# Patient Record
Sex: FEMALE | Race: WHITE | Employment: UNEMPLOYED | URBAN - METROPOLITAN AREA
[De-identification: names, ages, dates, MRNs, and addresses within clinical notes are randomized per-mention and may not be internally consistent; named-entity substitution may affect disease eponyms.]

---

## 2023-02-08 RX ORDER — NADOLOL 20 MG/1
20 TABLET ORAL DAILY
COMMUNITY

## 2023-02-08 RX ORDER — FUROSEMIDE 20 MG/1
20 TABLET ORAL DAILY
COMMUNITY

## 2023-02-08 RX ORDER — ROSUVASTATIN CALCIUM 10 MG/1
10 TABLET, COATED ORAL DAILY
COMMUNITY

## 2023-02-08 RX ORDER — SPIRONOLACTONE 50 MG/1
50 TABLET, FILM COATED ORAL DAILY
COMMUNITY

## 2023-02-08 RX ORDER — CALCIUM CARBONATE 500(1250)
500 TABLET ORAL DAILY
COMMUNITY

## 2023-02-08 NOTE — PROGRESS NOTES
[address] [facility] time____1000________        Surgery time__1130__________    Take the following medications with a sip of water: Follow your MD/Surgeons pre-procedure instructions regarding your medications     Do not eat or drink anything after 12:00 midnight prior to your surgery. This includes water chewing gum, mints and ice chips. You may brush your teeth and gargle the morning of your surgery, but do not swallow the water     Please see your family doctor/pediatrician for a history and physical and/or concerning medications. Bring any test results/reports from your physicians office. If you are under the care of a heart doctor or specialist doctor, please be aware that you may be asked to them for clearance    You may be asked to stop blood thinners such as Coumadin, Plavix, Fragmin, Lovenox, etc., or any anti-inflammatories such as:  Aspirin, Ibuprofen, Advil, Naproxen prior to your surgery. We also ask that you stop any OTC medications such as fish oil, vitamin E, glucosamine, garlic, Multivitamins, COQ 10, etc.    We ask that you do not smoke 24 hours prior to surgery  We ask that you do not  drink any alcoholic beverages 24 hours prior to surgery     You must make arrangements for a responsible adult to take you home after your surgery. For your safety you will not be allowed to leave alone or drive yourself home. Your surgery will be cancelled if you do not have a ride home. Also for your safety, it is strongly suggested that someone stay with you the first 24 hours after your surgery. A parent or legal guardian must accompany a child scheduled for surgery and plan to stay at the hospital until the child is discharged. Please do not bring other children with you. For your comfort, please wear simple loose fitting clothing to the hospital.  Please do not bring valuables.     Do not wear any make-up or nail polish on your fingers or toes      For your safety, please do not wear any jewelry or body piercing's on the day of surgery. All jewelry must be removed. If you have dentures, they will be removed before going to operating room. For your convenience, we will provide you with a container. If you wear contact lenses or glasses, they will be removed, please bring a case for them. If you have a living will and a durable power of  for healthcare, please bring in a copy. As part of our patient safety program to minimize surgical site infections, we ask you to do the following:    Please notify your surgeon if you develop any illness between         now and the  day of your surgery. This includes a cough, cold, fever, sore throat, nausea,         or vomiting, and diarrhea, etc.   Please notify your surgeon if you experience dizziness, shortness         of breath or blurred vision between now and the time of your surgery. Do not shave your operative site 96 hours prior to surgery. For face and neck surgery, men may use an electric razor 48 hours   prior to surgery. You may shower the night before surgery or the morning of   your surgery with an antibacterial soap. You will need to bring a photo ID and insurance card    [facility] has an onsite pharmacy, would you like to utilize our pharmacy     If you will be staying overnight and use a C-pap machine, please bring   your C-pap to hospital     Our goal is to provide you with excellent care, therefore, visitors will be limited to two(2) in the room at a time so that we may focus on providing this care for you. Please contact pre-admission testing if you have any further questions. [facility] phone number:  [phone]     [facility] fax number:  [phone]  Please note these are generalized instructions for all surgical cases, you may be provided with more specific instructions according to your surgery.     C-Difficile admission screening and protocol:       * Admitted with diarrhea? [] YES    [x]  NO     *Prior history of C-Diff. In last 3 months? [] YES    [x]  NO     *Antibiotic use in the past 6-8 weeks? [x]  NO    []  YES                 If yes, which ANTIBIOTIC AND REASON______     *Prior hospitalization or nursing home in the last month? []  YES    []  NO        SAFETY FIRST. .call before you fall

## 2023-02-17 ENCOUNTER — ANESTHESIA EVENT (OUTPATIENT)
Dept: SURGERY | Age: 58
End: 2023-02-17
Payer: COMMERCIAL

## 2023-02-17 RX ORDER — SODIUM CHLORIDE 9 MG/ML
INJECTION, SOLUTION INTRAVENOUS PRN
OUTPATIENT
Start: 2023-02-17

## 2023-02-17 RX ORDER — SODIUM CHLORIDE 0.9 % (FLUSH) 0.9 %
5-40 SYRINGE (ML) INJECTION EVERY 12 HOURS SCHEDULED
OUTPATIENT
Start: 2023-02-17

## 2023-02-17 RX ORDER — SODIUM CHLORIDE 0.9 % (FLUSH) 0.9 %
5-40 SYRINGE (ML) INJECTION PRN
OUTPATIENT
Start: 2023-02-17

## 2023-02-17 NOTE — PRE-PROCEDURE INSTRUCTIONS
[address] [facility] [address]  [phone]        Pre-Op Phone Call:     Patient Name: Pebbles Vasquez     Telephone Information:   Mobile [phone]     Home phone:  [phone]    Surgery Time:    8:50 AM     Arrival Time:  0720     Left extended Message:  NA     Message left with:     Recent change in health status:  NA     Advised of transportation/ policy:  Yes     NPO policy reviewed:  Yes     Advised to take morning heart/blood pressure medications with sips of water morning of surgery?  Yes     Instructed to bring eye drops, photo identification, and insurance card day of surgery?  Yes     Advised to wear short sleeved button down shirt (no T-shirt underneath):  Yes     Advised not to wear jewelry, hairpins, or pantyhose day of surgery?  Yes     Advised not to wear make-up and to wash face day of surgery?  Yes    Remarks:        Electronically signed by:  Paulina Oseguera RN at 2/17/2023 9:41 AM

## 2023-02-19 ENCOUNTER — PREP FOR PROCEDURE (OUTPATIENT)
Dept: OPHTHALMOLOGY | Age: 58
End: 2023-02-19

## 2023-02-19 RX ORDER — SODIUM CHLORIDE 0.9 % (FLUSH) 0.9 %
5-40 SYRINGE (ML) INJECTION EVERY 12 HOURS SCHEDULED
Status: CANCELLED | OUTPATIENT
Start: 2023-02-19

## 2023-02-19 RX ORDER — KETOROLAC TROMETHAMINE 5 MG/ML
1 SOLUTION OPHTHALMIC SEE ADMIN INSTRUCTIONS
Status: CANCELLED | OUTPATIENT
Start: 2023-02-19

## 2023-02-19 RX ORDER — CYCLOPENTOLATE HYDROCHLORIDE 10 MG/ML
1 SOLUTION/ DROPS OPHTHALMIC PRN
Status: CANCELLED | OUTPATIENT
Start: 2023-02-19

## 2023-02-19 RX ORDER — PHENYLEPHRINE HCL 2.5 %
1 DROPS OPHTHALMIC (EYE) SEE ADMIN INSTRUCTIONS
Status: CANCELLED | OUTPATIENT
Start: 2023-02-19

## 2023-02-19 RX ORDER — SODIUM CHLORIDE 9 MG/ML
INJECTION, SOLUTION INTRAVENOUS PRN
Status: CANCELLED | OUTPATIENT
Start: 2023-02-19

## 2023-02-19 RX ORDER — TROPICAMIDE 10 MG/ML
1 SOLUTION/ DROPS OPHTHALMIC SEE ADMIN INSTRUCTIONS
Status: CANCELLED | OUTPATIENT
Start: 2023-02-19

## 2023-02-19 RX ORDER — CIPROFLOXACIN HYDROCHLORIDE 3.5 MG/ML
1 SOLUTION/ DROPS TOPICAL SEE ADMIN INSTRUCTIONS
Status: CANCELLED | OUTPATIENT
Start: 2023-02-19

## 2023-02-19 RX ORDER — SODIUM CHLORIDE 0.9 % (FLUSH) 0.9 %
5-40 SYRINGE (ML) INJECTION PRN
Status: CANCELLED | OUTPATIENT
Start: 2023-02-19

## 2023-02-19 RX ORDER — TETRACAINE HYDROCHLORIDE 5 MG/ML
1 SOLUTION OPHTHALMIC SEE ADMIN INSTRUCTIONS
Status: CANCELLED | OUTPATIENT
Start: 2023-02-19

## 2023-02-20 ENCOUNTER — HOSPITAL ENCOUNTER (OUTPATIENT)
Age: 58
Setting detail: OUTPATIENT SURGERY
Discharge: HOME | End: 2023-02-20
Attending: STUDENT IN AN ORGANIZED HEALTH CARE EDUCATION/TRAINING PROGRAM | Admitting: STUDENT IN AN ORGANIZED HEALTH CARE EDUCATION/TRAINING PROGRAM
Payer: COMMERCIAL

## 2023-02-20 ENCOUNTER — ANESTHESIA (OUTPATIENT)
Dept: SURGERY | Age: 58
End: 2023-02-20
Payer: COMMERCIAL

## 2023-02-20 VITALS
BODY MASS INDEX: 27.74 KG/M2 | WEIGHT: 183 LBS | RESPIRATION RATE: 14 BRPM | TEMPERATURE: 96.7 F | OXYGEN SATURATION: 100 % | SYSTOLIC BLOOD PRESSURE: 152 MMHG | DIASTOLIC BLOOD PRESSURE: 97 MMHG | HEIGHT: 68 IN | HEART RATE: 69 BPM

## 2023-02-20 PROCEDURE — 3600000014 HC SURGERY LEVEL 4 ADDTL 15MIN: Performed by: STUDENT IN AN ORGANIZED HEALTH CARE EDUCATION/TRAINING PROGRAM

## 2023-02-20 PROCEDURE — 6370000000 HC RX 637 (ALT 250 FOR IP): Performed by: STUDENT IN AN ORGANIZED HEALTH CARE EDUCATION/TRAINING PROGRAM

## 2023-02-20 PROCEDURE — V2632 POST CHMBR INTRAOCULAR LENS: HCPCS | Performed by: STUDENT IN AN ORGANIZED HEALTH CARE EDUCATION/TRAINING PROGRAM

## 2023-02-20 PROCEDURE — 7100000010 HC PHASE II RECOVERY - FIRST 15 MIN: Performed by: STUDENT IN AN ORGANIZED HEALTH CARE EDUCATION/TRAINING PROGRAM

## 2023-02-20 PROCEDURE — 6360000002 HC RX W HCPCS: Performed by: NURSE ANESTHETIST, CERTIFIED REGISTERED

## 2023-02-20 PROCEDURE — 3700000001 HC ADD 15 MINUTES (ANESTHESIA): Performed by: STUDENT IN AN ORGANIZED HEALTH CARE EDUCATION/TRAINING PROGRAM

## 2023-02-20 PROCEDURE — 3700000000 HC ANESTHESIA ATTENDED CARE: Performed by: STUDENT IN AN ORGANIZED HEALTH CARE EDUCATION/TRAINING PROGRAM

## 2023-02-20 PROCEDURE — 2500000003 HC RX 250 WO HCPCS: Performed by: STUDENT IN AN ORGANIZED HEALTH CARE EDUCATION/TRAINING PROGRAM

## 2023-02-20 PROCEDURE — 3600000004 HC SURGERY LEVEL 4 BASE: Performed by: STUDENT IN AN ORGANIZED HEALTH CARE EDUCATION/TRAINING PROGRAM

## 2023-02-20 PROCEDURE — 2580000003 HC RX 258: Performed by: ANESTHESIOLOGY

## 2023-02-20 PROCEDURE — 2709999900 HC NON-CHARGEABLE SUPPLY: Performed by: STUDENT IN AN ORGANIZED HEALTH CARE EDUCATION/TRAINING PROGRAM

## 2023-02-20 DEVICE — LENS CLAREON IOL 26.0D: Type: IMPLANTABLE DEVICE | Site: EYE | Status: FUNCTIONAL

## 2023-02-20 RX ORDER — SODIUM CHLORIDE 9 MG/ML
INJECTION, SOLUTION INTRAVENOUS PRN
Status: DISCONTINUED | OUTPATIENT
Start: 2023-02-20 | End: 2023-02-20 | Stop reason: SDUPTHER

## 2023-02-20 RX ORDER — PREDNISOLONE ACETATE 10 MG/ML
SUSPENSION/ DROPS OPHTHALMIC
Status: COMPLETED | OUTPATIENT
Start: 2023-02-20 | End: 2023-02-20

## 2023-02-20 RX ORDER — SODIUM CHLORIDE 9 MG/ML
INJECTION, SOLUTION INTRAVENOUS PRN
Status: DISCONTINUED | OUTPATIENT
Start: 2023-02-20 | End: 2023-02-20 | Stop reason: HOSPADM

## 2023-02-20 RX ORDER — TETRACAINE HYDROCHLORIDE 5 MG/ML
SOLUTION OPHTHALMIC
Status: COMPLETED | OUTPATIENT
Start: 2023-02-20 | End: 2023-02-20

## 2023-02-20 RX ORDER — MIDAZOLAM HYDROCHLORIDE 1 MG/ML
INJECTION INTRAMUSCULAR; INTRAVENOUS PRN
Status: DISCONTINUED | OUTPATIENT
Start: 2023-02-20 | End: 2023-02-20 | Stop reason: SDUPTHER

## 2023-02-20 RX ORDER — CIPROFLOXACIN HYDROCHLORIDE 3.5 MG/ML
1 SOLUTION/ DROPS TOPICAL SEE ADMIN INSTRUCTIONS
Status: COMPLETED | OUTPATIENT
Start: 2023-02-20 | End: 2023-02-20

## 2023-02-20 RX ORDER — SODIUM CHLORIDE 0.9 % (FLUSH) 0.9 %
5-40 SYRINGE (ML) INJECTION PRN
Status: DISCONTINUED | OUTPATIENT
Start: 2023-02-20 | End: 2023-02-20 | Stop reason: SDUPTHER

## 2023-02-20 RX ORDER — SODIUM CHLORIDE 0.9 % (FLUSH) 0.9 %
5-40 SYRINGE (ML) INJECTION PRN
Status: DISCONTINUED | OUTPATIENT
Start: 2023-02-20 | End: 2023-02-20 | Stop reason: HOSPADM

## 2023-02-20 RX ORDER — BALANCED SALT SOLUTION 6.4; .75; .48; .3; 3.9; 1.7 MG/ML; MG/ML; MG/ML; MG/ML; MG/ML; MG/ML
SOLUTION OPHTHALMIC
Status: COMPLETED | OUTPATIENT
Start: 2023-02-20 | End: 2023-02-20

## 2023-02-20 RX ORDER — BRIMONIDINE TARTRATE 2 MG/ML
SOLUTION/ DROPS OPHTHALMIC
Status: COMPLETED | OUTPATIENT
Start: 2023-02-20 | End: 2023-02-20

## 2023-02-20 RX ORDER — PHENYLEPHRINE HCL 2.5 %
1 DROPS OPHTHALMIC (EYE) SEE ADMIN INSTRUCTIONS
Status: DISCONTINUED | OUTPATIENT
Start: 2023-02-20 | End: 2023-02-20 | Stop reason: HOSPADM

## 2023-02-20 RX ORDER — TETRACAINE HYDROCHLORIDE 5 MG/ML
1 SOLUTION OPHTHALMIC SEE ADMIN INSTRUCTIONS
Status: DISCONTINUED | OUTPATIENT
Start: 2023-02-20 | End: 2023-02-20 | Stop reason: HOSPADM

## 2023-02-20 RX ORDER — SODIUM CHLORIDE 0.9 % (FLUSH) 0.9 %
5-40 SYRINGE (ML) INJECTION EVERY 12 HOURS SCHEDULED
Status: DISCONTINUED | OUTPATIENT
Start: 2023-02-20 | End: 2023-02-20 | Stop reason: SDUPTHER

## 2023-02-20 RX ORDER — TROPICAMIDE 10 MG/ML
1 SOLUTION/ DROPS OPHTHALMIC SEE ADMIN INSTRUCTIONS
Status: DISCONTINUED | OUTPATIENT
Start: 2023-02-20 | End: 2023-02-20 | Stop reason: HOSPADM

## 2023-02-20 RX ORDER — CYCLOPENTOLATE HYDROCHLORIDE 10 MG/ML
1 SOLUTION/ DROPS OPHTHALMIC PRN
Status: DISCONTINUED | OUTPATIENT
Start: 2023-02-20 | End: 2023-02-20 | Stop reason: HOSPADM

## 2023-02-20 RX ORDER — SODIUM CHLORIDE 0.9 % (FLUSH) 0.9 %
5-40 SYRINGE (ML) INJECTION EVERY 12 HOURS SCHEDULED
Status: DISCONTINUED | OUTPATIENT
Start: 2023-02-20 | End: 2023-02-20 | Stop reason: HOSPADM

## 2023-02-20 RX ORDER — OFLOXACIN 3 MG/ML
SOLUTION/ DROPS OPHTHALMIC
Status: COMPLETED | OUTPATIENT
Start: 2023-02-20 | End: 2023-02-20

## 2023-02-20 RX ORDER — KETOROLAC TROMETHAMINE 5 MG/ML
1 SOLUTION OPHTHALMIC SEE ADMIN INSTRUCTIONS
Status: DISCONTINUED | OUTPATIENT
Start: 2023-02-20 | End: 2023-02-20 | Stop reason: HOSPADM

## 2023-02-20 RX ADMIN — KETOROLAC TROMETHAMINE 1 DROP: 0.5 SOLUTION OPHTHALMIC at 07:54

## 2023-02-20 RX ADMIN — TROPICAMIDE 1 DROP: 10 SOLUTION/ DROPS OPHTHALMIC at 08:04

## 2023-02-20 RX ADMIN — TROPICAMIDE 1 DROP: 10 SOLUTION/ DROPS OPHTHALMIC at 07:54

## 2023-02-20 RX ADMIN — PHENYLEPHRINE HYDROCHLORIDE 1 DROP: 25 SOLUTION/ DROPS OPHTHALMIC at 07:54

## 2023-02-20 RX ADMIN — PHENYLEPHRINE HYDROCHLORIDE 1 DROP: 25 SOLUTION/ DROPS OPHTHALMIC at 08:04

## 2023-02-20 RX ADMIN — SODIUM CHLORIDE: 9 INJECTION, SOLUTION INTRAVENOUS at 08:03

## 2023-02-20 RX ADMIN — TETRACAINE HYDROCHLORIDE 1 DROP: 5 SOLUTION OPHTHALMIC at 07:54

## 2023-02-20 RX ADMIN — PHENYLEPHRINE HYDROCHLORIDE 1 DROP: 25 SOLUTION/ DROPS OPHTHALMIC at 08:09

## 2023-02-20 RX ADMIN — CYCLOPENTOLATE HYDROCHLORIDE 1 DROP: 10 SOLUTION OPHTHALMIC at 07:54

## 2023-02-20 RX ADMIN — TROPICAMIDE 1 DROP: 10 SOLUTION/ DROPS OPHTHALMIC at 08:09

## 2023-02-20 RX ADMIN — CYCLOPENTOLATE HYDROCHLORIDE 1 DROP: 10 SOLUTION OPHTHALMIC at 08:04

## 2023-02-20 RX ADMIN — CIPROFLOXACIN 1 DROP: 3 SOLUTION OPHTHALMIC at 07:54

## 2023-02-20 RX ADMIN — MIDAZOLAM 2 MG: 1 INJECTION INTRAMUSCULAR; INTRAVENOUS at 09:11

## 2023-02-20 RX ADMIN — CYCLOPENTOLATE HYDROCHLORIDE 1 DROP: 10 SOLUTION OPHTHALMIC at 08:09

## 2023-02-20 ASSESSMENT — LIFESTYLE VARIABLES: SMOKING_STATUS: 0

## 2023-02-20 ASSESSMENT — PAIN - FUNCTIONAL ASSESSMENT: PAIN_FUNCTIONAL_ASSESSMENT: NONE - DENIES PAIN

## 2023-02-20 ASSESSMENT — PAIN SCALES - GENERAL
PAINLEVEL_OUTOF10: 0

## 2023-02-20 NOTE — OP NOTE
Anne Abreu    OPERATIVE NOTE    Preoperative Diagnosis: Visually significant cataract left eye    Postoperative Diagnosis: Visually significant cataract left eye    Procedure: Phacoemulsification with intraocular lens implantation, left eye    Surgeon: Susan Modi. Rylie Acosta MD, CRYS    Anesthesia: MAC, topical.    Complications: none    Estimated blood loss: less than 1 ml. Specimens: none    Indications for procedure: The patient is a 61y.o. year old who has experienced painless, progressive deterioration in vision which corresponds with increasing lenticular opacification of the left eye. This is contributing to an overall decline in visual functioning and interfering with activities of daily living as described in the medical record. After discussion of relevant indications, risks, benefits, alternatives, and limitations, the patient consented to proceed with cataract phacoemulsification with lens implantation for visual rehabilitation. Details of the procedure: Following informed consent, the patient was taken to the operating room and placed in the supine position. Monitored anesthesia care was administered. The patient's name, eye, intraocular lens model and power were verified (time-out). The patient was positioned under the operative microscope and the operative eye was prepped and draped in the usual sterile fashion using aseptic technique for cataract surgery. A wire lid speculum was placed. A peripheral paracentesis was made using a 1.1 mm blade. 2% preservative free lidocaine with dilute 1:1000 preservative free epinephrine (Epi-Shugarcaine) was injected through the side port incision for topical anesthesia. The anterior chamber was deepened with viscoelastic. A tri-planar temporal clear cornea incision was made with a 2.4 mm keratome. A continuous curvilinear capsulorrhexis was initiated with a cystotome and completed using Utrata forceps.  Gentle hydrodissection and hydrodelineation were performed with confirmed free rotation of the nucleus. The lens nucleus was carefully removed using the phacoemulsification probe (CDE: 5.74). Any remaining cortex was removed using the automated irrigation/aspiration unit. The posterior capsule was vacuum polished clean. The capsular bag was deepened using viscoelastic. The intraocular lens was loaded into the injection cartridge. The lens power and type were confirmed (SY60WF 26.0D). The IOL was delivered into the capsular bag. The trailing haptic was dialed into position with a Sinskey hook. Residual viscoelastic was removed with the irrigation/aspiration unit. The eye was inflated with sterile BSS to a physiologic IOP. The corneal wounds were hydrated with sterile BSS, inspected and found to be water tight. The intraocular lens was confirmed to be well centered and the pupil round at the conclusion of the case. The lid speculum and drape were removed. The patient had Ofloxacin. Prednisolone, and Alphagan solutions placed on the eye. A sterile eye shield was placed over the eye. The patient tolerated the cataract surgery well without complication. The patient was taken to PACU in excellent condition. The patient will remove the shield at a designated time later in the day and begin using post-operative eyedrops as instructed.  The patient is scheduled to return on the first post-operative day for follow up.      9:30 AM 02/20/23  Ev Dumont MD, CRYS

## 2023-02-20 NOTE — ANESTHESIA POSTPROCEDURE EVALUATION
Department of Anesthesiology  Postprocedure Note    Patient: Latoya Osie  MRN: [id number]  YOB: 1965  Date of evaluation: 2/20/2023      Procedure Summary     Date: 02/20/23 Room / Location: [address]    Anesthesia Start: 9335 Anesthesia Stop: 0583    Procedure: PHACOEMULSIFICATION WITH INTRAOCULAR LENS IMPLANT - LEFT EYE (Left: Eye) Diagnosis:       Nuclear sclerotic cataract of left eye      (Nuclear sclerotic cataract of left eye)    Surgeons: Lulú Phelan MD Responsible Provider: Price Krishna MD    Anesthesia Type: MAC ASA Status: 3          Anesthesia Typ: MAC    Ivy Phase I: Ivy Score: 10    Ivy Phase II: Ivy Score: 10      Anesthesia Post Evaluation    Patient location during evaluation: PACU  Patient participation: complete - patient participated  Level of consciousness: awake  Airway patency: patent  Nausea & Vomiting: no nausea and no vomiting  Complications: no  Cardiovascular status: hemodynamically stable and blood pressure returned to baseline  Respiratory status: spontaneous ventilation, nonlabored ventilation and room air  Hydration status: stable  Comments: Ms. Miranda Wright was seen resting comfortably following procedure. Appropriate for discharge home with .

## 2023-02-20 NOTE — ANESTHESIA PRE PROCEDURE
Department of Anesthesiology  Preprocedure Note       Name:  Abe Milian   Age:  61 y.o.  :  1965                                          MRN:  [id number]         Date:  2023      Surgeon: Chad Nobles):  Jessica Lawrence MD    Procedure: Procedure(s):  PHACOEMULSIFICATION WITH INTRAOCULAR LENS IMPLANT - LEFT EYE    Medications prior to admission:   Prior to Admission medications    Medication Sig Start Date End Date Taking?  Authorizing Provider   lactulose (CEPHULAC) 20 g packet Take 20 g by mouth 3 times daily   Yes Historical Provider, MD   furosemide (LASIX) 20 MG tablet Take 20 mg by mouth daily   Yes Historical Provider, MD   linaCLOtide (LINZESS) 72 MCG CAPS capsule Take 72 mcg by mouth every morning (before breakfast)   Yes Historical Provider, MD   spironolactone (ALDACTONE) 50 MG tablet Take 50 mg by mouth daily   Yes Historical Provider, MD   rifAXIMin (XIFAXAN) 550 MG tablet Take 550 mg by mouth 2 times daily   Yes Historical Provider, MD   nadolol (CORGARD) 20 MG tablet Take 20 mg by mouth daily   Yes Historical Provider, MD   rosuvastatin (CRESTOR) 10 MG tablet Take 10 mg by mouth daily   Yes Historical Provider, MD   calcium carbonate (OSCAL) 500 MG TABS tablet Take 500 mg by mouth daily   Yes Historical Provider, MD       Current medications:    Current Facility-Administered Medications   Medication Dose Route Frequency Provider Last Rate Last Admin    sodium chloride flush 0.9 % injection 5-40 mL  5-40 mL IntraVENous 2 times per day Colletta Smiles, MD        sodium chloride flush 0.9 % injection 5-40 mL  5-40 mL IntraVENous PRN Colletta Smiles, MD        0.9 % sodium chloride infusion   IntraVENous PRN Colletta Smiles, MD        tetracaine (TETRAVISC) 0.5 % ophthalmic solution 1 drop  1 drop Left Eye See Admin Instructions Jessica Lawrence MD   1 drop at 23 0754    tropicamide (MYDRIACYL) 1 % ophthalmic solution 1 drop  1 drop Left Eye See Admin Instructions Jessica Lawrence MD   1 drop at 23 0809    phenylephrine (MYDFRIN) 2.5 % ophthalmic solution 1 drop  1 drop Left Eye See Admin Instructions Emily Aviles MD   1 drop at 23 0809    cyclopentolate (CYCLOGYL) 1 % ophthalmic solution 1 drop  1 drop Left Eye PRN Emily Aviles MD   1 drop at 23 0809    ketorolac (ACULAR) 0.5 % ophthalmic solution 1 drop  1 drop Left Eye See Admin Instructions Emily Aviles MD   1 drop at 23 0754       Allergies: Allergies   Allergen Reactions    Latex        Problem List:  There is no problem list on file for this patient. Past Medical History:        Diagnosis Date    Hyperlipidemia     Hypertension     Liver disease        Past Surgical History:        Procedure Laterality Date    APPENDECTOMY       SECTION      CHOLECYSTECTOMY         Social History:    Social History     Tobacco Use    Smoking status: Never    Smokeless tobacco: Never   Substance Use Topics    Alcohol use: Not Currently     Comment: occasionally                                Counseling given: Not Answered      Vital Signs (Current):   Vitals:    23 1036 23 0752   BP:  131/75   Pulse:  71   Resp:  16   Temp:  97 °F (36.1 °C)   TempSrc:  Temporal   SpO2:  98%   Weight: 183 lb (83 kg) 183 lb (83 kg)   Height: 5' 8\" (1.727 m) 5' 8\" (1.727 m)                                              BP Readings from Last 3 Encounters:   23 131/75   11/06/15 128/79       NPO Status: Time of last liquid consumption: 2359                        Time of last solid consumption: 1800                        Date of last liquid consumption: 23                        Date of last solid food consumption: 23    BMI:   Wt Readings from Last 3 Encounters:   23 183 lb (83 kg)   11/06/15 190 lb 3.2 oz (86.3 kg)     Body mass index is 27.83 kg/m².     CBC:   Lab Results   Component Value Date/Time    WBC 7.0 2015 12:40 PM    RBC 4.25 2015 12:40 PM    HGB 13.5 2015 12:40 PM    HCT 41.1 2015 12:40 PM    MCV 96.7 11/03/2015 12:40 PM    RDW 13.3 11/03/2015 12:40 PM     11/03/2015 12:40 PM       CMP:   Lab Results   Component Value Date/Time     11/03/2015 12:40 PM    K 3.9 11/03/2015 12:40 PM     11/03/2015 12:40 PM    CO2 24 11/03/2015 12:40 PM    BUN 10 11/03/2015 12:40 PM    CREATININE 0.5 11/03/2015 12:40 PM    GFRAA >60 11/03/2015 12:40 PM    GFRAA >60 11/14/2012 08:55 PM    AGRATIO 1.2 11/14/2012 08:55 PM    LABGLOM >60 11/03/2015 12:40 PM    GLUCOSE 105 11/03/2015 12:40 PM    PROT 8.3 11/14/2012 08:55 PM    CALCIUM 9.0 11/03/2015 12:40 PM    BILITOT 1.00 11/14/2012 08:55 PM    ALKPHOS 196 11/14/2012 08:55 PM     11/14/2012 08:55 PM    ALT 58 11/14/2012 08:55 PM       POC Tests: No results for input(s): POCGLU, POCNA, POCK, POCCL, POCBUN, POCHEMO, POCHCT in the last 72 hours. Coags: No results found for: PROTIME, INR, APTT    HCG (If Applicable):   Lab Results   Component Value Date    PREGTESTUR Neg 11/14/2012        ABGs: No results found for: PHART, PO2ART, BTE2CFD, TVN1KJX, BEART, G6SPVMMB     Type & Screen (If Applicable):  No results found for: LABABO, LABRH    Drug/Infectious Status (If Applicable):  No results found for: HIV, HEPCAB    COVID-19 Screening (If Applicable): No results found for: COVID19        Anesthesia Evaluation   no history of anesthetic complications:   Airway: Mallampati: II  TM distance: >3 FB     Mouth opening: > = 3 FB   Dental:      Comment: Very poor dentition. Upper incisors blackened, chipped near gumline.     Pulmonary:       (-) COPD, asthma, rhonchi, wheezes, rales and not a current smoker                           Cardiovascular:  Exercise tolerance: good (>4 METS),   (+) hypertension:, hyperlipidemia    (-) dysrhythmias, orthopnea,  MIJARES, weak pulses and peripheral edema      Rhythm: regular  Rate: normal           Beta Blocker:  Dose within 24 Hrs (nadolol )         Neuro/Psych:   (+) psychiatric history (History of EtOH abuse):dementia (memory issues)   (-) seizures, TIA and CVA           GI/Hepatic/Renal:   (+) liver disease (Alcoholic cirrhosis, last LFTs elevated):,      (-) GERD (denies), no renal disease and no morbid obesity      ROS comment: +Linzess. Endo/Other:        (-) diabetes mellitus, blood dyscrasia                ROS comment: History of alcohol use disorder. Previously reported alcohol cessationsince 2018, patient reports sober x 7 months. Abdominal:         (-) obese Abdomen: soft. PE comment: No fluid wave, no concern for ascites. Vascular: Other Findings:           Anesthesia Plan      MAC     ASA 3     (Latex allergy. NPO appropriate. Ms. Estrada Pruitt denies active nausea / reflux.)        Anesthetic plan and risks discussed with patient. Plan discussed with CRNA. This pre-anesthesia assessment may be used as a history and physical.    DOS STAFF ADDENDUM:    Pt seen and examined, chart reviewed (including anesthesia, drug and allergy history). No interval changes to history and physical examination. Anesthetic plan, risks, benefits, alternatives, and personnel involved discussed with patient. Patient verbalized an understanding and agrees to proceed.       Yosef Longo MD  February 20, 2023  8:00 AM

## 2023-02-20 NOTE — DISCHARGE INSTRUCTIONS
Sol Rodriguez MD, CRYS    POST OPERATIVE INSTRUCTIONS FOR CATARACT SURGERY    Left   Eye       Starting the DAY of surgery use all drops as directed. Please locate the name of the individual medication you were prescribed below and follow the instructions for its use. ANTIBIOTIC: (CORREIA Cap): Your drop will be either Vigamox (moxifloxacin), Besivance or Polytrim. Instill 4 drops a day for 1 week. NSAID (GRAY Cap): Your drop will be BROMSITE, PROLENSA or KETOROLAC. Instill 1 drop daily for 4 weeks. Patients who receive KETOROLAC: Instill 1 drop 2 times a day for 2 weeks. STEROID (PINK Cap): Your steroid drop will be PREDNISOLONE ACETATE, INVELTYS OR LOTEMAX SM. Instill 4 drops a day for 1 week,   3 drops a day for 1 week,   2 drops a day for 1 week,  1 drop a day for 1 week. Please bring ALL of your eye drops with you to surgery and all follow-up appointments. Wait at least 3-5 minutes between eye drops. It's normal for some drops to briefly sting. POST OPERATIVE CATARACT DROP SCHEDULE    Week 1 Day 1 Day 2 Day 3 Day 4 Day 5 Day 6 Day 7   Polo Pratt or POLYTRIM  (Tan) ? ?  ? ? ? ?  ? ? ? ?  ? ? ? ?  ? ? ? ?  ? ? ? ?  ? ?   ? ?  ? ?     Beuford Pleasure or KETOROLAC  Olimpia Mends) ? ? ? ? ? ? ? Green [place] or LOTEMAX SM  (Pink) ? ?  ? ? ? ?  ? ? ? ?  ? ? ? ?  ? ? ? ?  ? ? ? ?  ? ? ? ?  ? ? Week 2 Day 8 Day 9 Day 10 Day 11 Day 12 Day 13 Day 15   BROMSITE, PROLENSA or  KETOROLAC  Olimpia Mends) ? ? ? ? ? ? ? Green [place] or LOTEMAX SM   (Pink) ? ? ? ? ? ? ? ? ? ? ? ? ? ? ? ? ? ? ? ? ? Week 3 Day 15 Day 16 Day 17 Day 18 Day 19 Day 20 Day 21   Beuford Pleasure or  Olimpia Mends) ? ? ? ? ? ? ? Green [place] or LOTEMAX SM   (Pink) ? ? ? ? ? ? ? ? ? ? ? ? ? ? Week 4 Day 22 Day 23 Day 24 Day 25 Day 26 Day 2 Day 29   Beuford Pleasure or  Olimpia Gillis) ? ? ? ? ? ? ? Green [place] or LOTEMAX SM   (Pink) ?  ? ? ? ? ? ?       Wear your protective shield at bedtime and nap time for 1 week to prevent accidentally rubbing or bumping your eye.    The post-operative drops are VERY IMPORTANT. Use them as directed on the drop schedule given to you the day of surgery.    Do not lift over 25 lbs for the first week.    DO NOT RUB YOUR EYE.    You may wash your hair 24 hours after surgery, but avoid getting water in your eye for the first 5 days. Use a dry wash cloth to protect water from getting in your eye while taking a shower.    No eye makeup for 1 week.    You may return to work anytime provided your job does not require heavy lifting or straining. If you work in a jerrod environment, please take one week off work after surgery.    CALL THE OFFICE IMMEDIATELY IF YOU EXPERIENCE ANY OF THE FOLLOWING                   [phone]  Veil or curtain coming across vision.  Sudden decrease in vision.  Shower of NEW floaters.  Increase in pain.  Flashes of light.

## 2023-02-24 NOTE — PROGRESS NOTES
[address] [facility] time___1010_________        Surgery time__1140__________    Take the following medications with a sip of water: Follow your MD/Surgeons pre-procedure instructions regarding your medications     Do not eat or drink anything after 12:00 midnight prior to your surgery. This includes water chewing gum, mints and ice chips. You may brush your teeth and gargle the morning of your surgery, but do not swallow the water     Please see your family doctor/pediatrician for a history and physical and/or concerning medications. Bring any test results/reports from your physicians office. If you are under the care of a heart doctor or specialist doctor, please be aware that you may be asked to them for clearance    You may be asked to stop blood thinners such as Coumadin, Plavix, Fragmin, Lovenox, etc., or any anti-inflammatories such as:  Aspirin, Ibuprofen, Advil, Naproxen prior to your surgery. We also ask that you stop any OTC medications such as fish oil, vitamin E, glucosamine, garlic, Multivitamins, COQ 10, etc.    We ask that you do not smoke 24 hours prior to surgery  We ask that you do not  drink any alcoholic beverages 24 hours prior to surgery     You must make arrangements for a responsible adult to take you home after your surgery. For your safety you will not be allowed to leave alone or drive yourself home. Your surgery will be cancelled if you do not have a ride home. Also for your safety, it is strongly suggested that someone stay with you the first 24 hours after your surgery. A parent or legal guardian must accompany a child scheduled for surgery and plan to stay at the hospital until the child is discharged. Please do not bring other children with you. For your comfort, please wear simple loose fitting clothing to the hospital.  Please do not bring valuables.     Do not wear any make-up or nail polish on your fingers or toes      For your safety, please do not wear any jewelry or body piercing's on the day of surgery. All jewelry must be removed. If you have dentures, they will be removed before going to operating room. For your convenience, we will provide you with a container. If you wear contact lenses or glasses, they will be removed, please bring a case for them. If you have a living will and a durable power of  for healthcare, please bring in a copy. As part of our patient safety program to minimize surgical site infections, we ask you to do the following:    Please notify your surgeon if you develop any illness between         now and the  day of your surgery. This includes a cough, cold, fever, sore throat, nausea,         or vomiting, and diarrhea, etc.   Please notify your surgeon if you experience dizziness, shortness         of breath or blurred vision between now and the time of your surgery. Do not shave your operative site 96 hours prior to surgery. For face and neck surgery, men may use an electric razor 48 hours   prior to surgery. You may shower the night before surgery or the morning of   your surgery with an antibacterial soap. You will need to bring a photo ID and insurance card    [facility] has an onsite pharmacy, would you like to utilize our pharmacy     If you will be staying overnight and use a C-pap machine, please bring   your C-pap to hospital     Our goal is to provide you with excellent care, therefore, visitors will be limited to two(2) in the room at a time so that we may focus on providing this care for you. Please contact pre-admission testing if you have any further questions. [facility] phone number:  [phone]     [facility] fax number:  [phone]  Please note these are generalized instructions for all surgical cases, you may be provided with more specific instructions according to your surgery.     C-Difficile admission screening and protocol:       * Admitted with diarrhea? [] YES    [x]  NO     *Prior history of C-Diff. In last 3 months? [] YES    [x]  NO     *Antibiotic use in the past 6-8 weeks? [x]  NO    []  YES                 If yes, which ANTIBIOTIC AND REASON______     *Prior hospitalization or nursing home in the last month? []  YES    []  NO        SAFETY FIRST. .call before you fall

## 2023-03-03 NOTE — PERIOP NOTE
[facility]  [phone]        Pre-Op Phone Call:     Patient Name: Rufino Salazar     Telephone Information:   Mobile [phone]     Home phone:  [phone]    Surgery Time:    7:55 AM     Arrival Time:  6:25     Left extended Message:  Yes     Message left with: Spoke with patient      Recent change in health status:  No     Advised of transportation/ policy:  Yes     NPO policy reviewed: Yes     Advised to take morning heart/blood pressure medications with sips of water morning of surgery? Yes     Instructed to bring eye drops, photo identification, and insurance card day of surgery? Yes     Advised to wear short sleeved button down shirt (no T-shirt underneath):  Yes     Advised not to wear jewelry, hairpins, or pantyhose day of surgery? Yes     Advised not to wear make-up and to wash face day of surgery?   Yes    Remarks: Spoke with patient         Electronically signed by:  Brenna Sprague RN at 3/3/2023 9:54 AM

## 2023-03-05 ENCOUNTER — ANESTHESIA EVENT (OUTPATIENT)
Dept: SURGERY | Age: 58
End: 2023-03-05
Payer: COMMERCIAL

## 2023-03-05 RX ORDER — PHENYLEPHRINE HCL 2.5 %
1 DROPS OPHTHALMIC (EYE) SEE ADMIN INSTRUCTIONS
Status: CANCELLED | OUTPATIENT
Start: 2023-03-05

## 2023-03-05 RX ORDER — KETOROLAC TROMETHAMINE 5 MG/ML
1 SOLUTION OPHTHALMIC SEE ADMIN INSTRUCTIONS
Status: CANCELLED | OUTPATIENT
Start: 2023-03-05

## 2023-03-05 RX ORDER — TETRACAINE HYDROCHLORIDE 5 MG/ML
1 SOLUTION OPHTHALMIC SEE ADMIN INSTRUCTIONS
Status: CANCELLED | OUTPATIENT
Start: 2023-03-05

## 2023-03-05 RX ORDER — TROPICAMIDE 10 MG/ML
1 SOLUTION/ DROPS OPHTHALMIC SEE ADMIN INSTRUCTIONS
Status: CANCELLED | OUTPATIENT
Start: 2023-03-05

## 2023-03-05 RX ORDER — SODIUM CHLORIDE 0.9 % (FLUSH) 0.9 %
5-40 SYRINGE (ML) INJECTION PRN
Status: CANCELLED | OUTPATIENT
Start: 2023-03-05

## 2023-03-05 RX ORDER — CYCLOPENTOLATE HYDROCHLORIDE 10 MG/ML
1 SOLUTION/ DROPS OPHTHALMIC PRN
Status: CANCELLED | OUTPATIENT
Start: 2023-03-05

## 2023-03-05 RX ORDER — SODIUM CHLORIDE 0.9 % (FLUSH) 0.9 %
5-40 SYRINGE (ML) INJECTION EVERY 12 HOURS SCHEDULED
Status: CANCELLED | OUTPATIENT
Start: 2023-03-05

## 2023-03-05 RX ORDER — SODIUM CHLORIDE 9 MG/ML
INJECTION, SOLUTION INTRAVENOUS PRN
Status: CANCELLED | OUTPATIENT
Start: 2023-03-05

## 2023-03-05 RX ORDER — CIPROFLOXACIN HYDROCHLORIDE 3.5 MG/ML
1 SOLUTION/ DROPS TOPICAL SEE ADMIN INSTRUCTIONS
Status: CANCELLED | OUTPATIENT
Start: 2023-03-05

## 2023-03-05 ASSESSMENT — LIFESTYLE VARIABLES: SMOKING_STATUS: 0

## 2023-03-06 ENCOUNTER — HOSPITAL ENCOUNTER (OUTPATIENT)
Age: 58
Setting detail: OUTPATIENT SURGERY
Discharge: HOME | End: 2023-03-06
Attending: STUDENT IN AN ORGANIZED HEALTH CARE EDUCATION/TRAINING PROGRAM | Admitting: STUDENT IN AN ORGANIZED HEALTH CARE EDUCATION/TRAINING PROGRAM
Payer: COMMERCIAL

## 2023-03-06 ENCOUNTER — ANESTHESIA (OUTPATIENT)
Dept: SURGERY | Age: 58
End: 2023-03-06
Payer: COMMERCIAL

## 2023-03-06 VITALS
DIASTOLIC BLOOD PRESSURE: 64 MMHG | SYSTOLIC BLOOD PRESSURE: 106 MMHG | BODY MASS INDEX: 27.74 KG/M2 | RESPIRATION RATE: 15 BRPM | HEIGHT: 68 IN | OXYGEN SATURATION: 100 % | WEIGHT: 183 LBS | HEART RATE: 65 BPM | TEMPERATURE: 97.3 F

## 2023-03-06 PROCEDURE — 3600000014 HC SURGERY LEVEL 4 ADDTL 15MIN: Performed by: STUDENT IN AN ORGANIZED HEALTH CARE EDUCATION/TRAINING PROGRAM

## 2023-03-06 PROCEDURE — 2500000003 HC RX 250 WO HCPCS: Performed by: STUDENT IN AN ORGANIZED HEALTH CARE EDUCATION/TRAINING PROGRAM

## 2023-03-06 PROCEDURE — V2632 POST CHMBR INTRAOCULAR LENS: HCPCS | Performed by: STUDENT IN AN ORGANIZED HEALTH CARE EDUCATION/TRAINING PROGRAM

## 2023-03-06 PROCEDURE — 2580000003 HC RX 258: Performed by: NURSE ANESTHETIST, CERTIFIED REGISTERED

## 2023-03-06 PROCEDURE — 2580000003 HC RX 258: Performed by: ANESTHESIOLOGY

## 2023-03-06 PROCEDURE — 6370000000 HC RX 637 (ALT 250 FOR IP): Performed by: STUDENT IN AN ORGANIZED HEALTH CARE EDUCATION/TRAINING PROGRAM

## 2023-03-06 PROCEDURE — 7100000010 HC PHASE II RECOVERY - FIRST 15 MIN: Performed by: STUDENT IN AN ORGANIZED HEALTH CARE EDUCATION/TRAINING PROGRAM

## 2023-03-06 PROCEDURE — 3700000000 HC ANESTHESIA ATTENDED CARE: Performed by: STUDENT IN AN ORGANIZED HEALTH CARE EDUCATION/TRAINING PROGRAM

## 2023-03-06 PROCEDURE — 6360000002 HC RX W HCPCS: Performed by: NURSE ANESTHETIST, CERTIFIED REGISTERED

## 2023-03-06 PROCEDURE — 3700000001 HC ADD 15 MINUTES (ANESTHESIA): Performed by: STUDENT IN AN ORGANIZED HEALTH CARE EDUCATION/TRAINING PROGRAM

## 2023-03-06 PROCEDURE — 3600000004 HC SURGERY LEVEL 4 BASE: Performed by: STUDENT IN AN ORGANIZED HEALTH CARE EDUCATION/TRAINING PROGRAM

## 2023-03-06 PROCEDURE — 2709999900 HC NON-CHARGEABLE SUPPLY: Performed by: STUDENT IN AN ORGANIZED HEALTH CARE EDUCATION/TRAINING PROGRAM

## 2023-03-06 DEVICE — IMPLANTABLE DEVICE: Type: IMPLANTABLE DEVICE | Site: EYE | Status: FUNCTIONAL

## 2023-03-06 RX ORDER — TETRACAINE HYDROCHLORIDE 5 MG/ML
SOLUTION OPHTHALMIC
Status: COMPLETED | OUTPATIENT
Start: 2023-03-06 | End: 2023-03-06

## 2023-03-06 RX ORDER — BALANCED SALT SOLUTION 6.4; .75; .48; .3; 3.9; 1.7 MG/ML; MG/ML; MG/ML; MG/ML; MG/ML; MG/ML
SOLUTION OPHTHALMIC
Status: COMPLETED | OUTPATIENT
Start: 2023-03-06 | End: 2023-03-06

## 2023-03-06 RX ORDER — SODIUM CHLORIDE 0.9 % (FLUSH) 0.9 %
5-40 SYRINGE (ML) INJECTION EVERY 12 HOURS SCHEDULED
Status: DISCONTINUED | OUTPATIENT
Start: 2023-03-06 | End: 2023-03-06 | Stop reason: HOSPADM

## 2023-03-06 RX ORDER — PREDNISOLONE ACETATE 10 MG/ML
SUSPENSION/ DROPS OPHTHALMIC
Status: COMPLETED | OUTPATIENT
Start: 2023-03-06 | End: 2023-03-06

## 2023-03-06 RX ORDER — CYCLOPENTOLATE HYDROCHLORIDE 10 MG/ML
1 SOLUTION/ DROPS OPHTHALMIC PRN
Status: DISCONTINUED | OUTPATIENT
Start: 2023-03-06 | End: 2023-03-06 | Stop reason: HOSPADM

## 2023-03-06 RX ORDER — MIDAZOLAM HYDROCHLORIDE 1 MG/ML
INJECTION INTRAMUSCULAR; INTRAVENOUS PRN
Status: DISCONTINUED | OUTPATIENT
Start: 2023-03-06 | End: 2023-03-06 | Stop reason: SDUPTHER

## 2023-03-06 RX ORDER — TROPICAMIDE 10 MG/ML
1 SOLUTION/ DROPS OPHTHALMIC SEE ADMIN INSTRUCTIONS
Status: DISCONTINUED | OUTPATIENT
Start: 2023-03-06 | End: 2023-03-06 | Stop reason: HOSPADM

## 2023-03-06 RX ORDER — SODIUM CHLORIDE 9 MG/ML
INJECTION, SOLUTION INTRAVENOUS PRN
Status: DISCONTINUED | OUTPATIENT
Start: 2023-03-06 | End: 2023-03-06 | Stop reason: HOSPADM

## 2023-03-06 RX ORDER — SODIUM CHLORIDE 0.9 % (FLUSH) 0.9 %
5-40 SYRINGE (ML) INJECTION EVERY 12 HOURS SCHEDULED
Status: DISCONTINUED | OUTPATIENT
Start: 2023-03-06 | End: 2023-03-06 | Stop reason: SDUPTHER

## 2023-03-06 RX ORDER — TETRACAINE HYDROCHLORIDE 5 MG/ML
1 SOLUTION OPHTHALMIC SEE ADMIN INSTRUCTIONS
Status: DISCONTINUED | OUTPATIENT
Start: 2023-03-06 | End: 2023-03-06 | Stop reason: HOSPADM

## 2023-03-06 RX ORDER — KETOROLAC TROMETHAMINE 5 MG/ML
1 SOLUTION OPHTHALMIC SEE ADMIN INSTRUCTIONS
Status: DISCONTINUED | OUTPATIENT
Start: 2023-03-06 | End: 2023-03-06 | Stop reason: HOSPADM

## 2023-03-06 RX ORDER — PHENYLEPHRINE HCL 2.5 %
1 DROPS OPHTHALMIC (EYE) SEE ADMIN INSTRUCTIONS
Status: DISCONTINUED | OUTPATIENT
Start: 2023-03-06 | End: 2023-03-06 | Stop reason: HOSPADM

## 2023-03-06 RX ORDER — SODIUM CHLORIDE 9 MG/ML
INJECTION, SOLUTION INTRAVENOUS CONTINUOUS PRN
Status: DISCONTINUED | OUTPATIENT
Start: 2023-03-06 | End: 2023-03-06 | Stop reason: SDUPTHER

## 2023-03-06 RX ORDER — BRIMONIDINE TARTRATE 2 MG/ML
SOLUTION/ DROPS OPHTHALMIC
Status: COMPLETED | OUTPATIENT
Start: 2023-03-06 | End: 2023-03-06

## 2023-03-06 RX ORDER — OFLOXACIN 3 MG/ML
SOLUTION/ DROPS OPHTHALMIC
Status: COMPLETED | OUTPATIENT
Start: 2023-03-06 | End: 2023-03-06

## 2023-03-06 RX ORDER — SODIUM CHLORIDE 0.9 % (FLUSH) 0.9 %
5-40 SYRINGE (ML) INJECTION PRN
Status: DISCONTINUED | OUTPATIENT
Start: 2023-03-06 | End: 2023-03-06 | Stop reason: HOSPADM

## 2023-03-06 RX ORDER — SODIUM CHLORIDE 0.9 % (FLUSH) 0.9 %
5-40 SYRINGE (ML) INJECTION PRN
Status: DISCONTINUED | OUTPATIENT
Start: 2023-03-06 | End: 2023-03-06 | Stop reason: SDUPTHER

## 2023-03-06 RX ORDER — SODIUM CHLORIDE 9 MG/ML
INJECTION, SOLUTION INTRAVENOUS PRN
Status: DISCONTINUED | OUTPATIENT
Start: 2023-03-06 | End: 2023-03-06 | Stop reason: SDUPTHER

## 2023-03-06 RX ORDER — CIPROFLOXACIN HYDROCHLORIDE 3.5 MG/ML
1 SOLUTION/ DROPS TOPICAL SEE ADMIN INSTRUCTIONS
Status: COMPLETED | OUTPATIENT
Start: 2023-03-06 | End: 2023-03-06

## 2023-03-06 RX ADMIN — CYCLOPENTOLATE HYDROCHLORIDE 1 DROP: 10 SOLUTION OPHTHALMIC at 06:50

## 2023-03-06 RX ADMIN — TROPICAMIDE 1 DROP: 10 SOLUTION/ DROPS OPHTHALMIC at 06:50

## 2023-03-06 RX ADMIN — CIPROFLOXACIN 1 DROP: 3 SOLUTION OPHTHALMIC at 06:40

## 2023-03-06 RX ADMIN — PHENYLEPHRINE HYDROCHLORIDE 1 DROP: 25 SOLUTION/ DROPS OPHTHALMIC at 06:45

## 2023-03-06 RX ADMIN — KETOROLAC TROMETHAMINE 1 DROP: 0.5 SOLUTION OPHTHALMIC at 06:40

## 2023-03-06 RX ADMIN — MIDAZOLAM 2 MG: 1 INJECTION INTRAMUSCULAR; INTRAVENOUS at 08:02

## 2023-03-06 RX ADMIN — CYCLOPENTOLATE HYDROCHLORIDE 1 DROP: 10 SOLUTION OPHTHALMIC at 06:40

## 2023-03-06 RX ADMIN — CYCLOPENTOLATE HYDROCHLORIDE 1 DROP: 10 SOLUTION OPHTHALMIC at 06:45

## 2023-03-06 RX ADMIN — TETRACAINE HYDROCHLORIDE 1 DROP: 5 SOLUTION OPHTHALMIC at 06:40

## 2023-03-06 RX ADMIN — PHENYLEPHRINE HYDROCHLORIDE 1 DROP: 25 SOLUTION/ DROPS OPHTHALMIC at 06:50

## 2023-03-06 RX ADMIN — TROPICAMIDE 1 DROP: 10 SOLUTION/ DROPS OPHTHALMIC at 06:40

## 2023-03-06 RX ADMIN — SODIUM CHLORIDE: 9 INJECTION, SOLUTION INTRAVENOUS at 07:58

## 2023-03-06 RX ADMIN — TROPICAMIDE 1 DROP: 10 SOLUTION/ DROPS OPHTHALMIC at 06:45

## 2023-03-06 RX ADMIN — SODIUM CHLORIDE: 9 INJECTION, SOLUTION INTRAVENOUS at 06:47

## 2023-03-06 RX ADMIN — PHENYLEPHRINE HYDROCHLORIDE 1 DROP: 25 SOLUTION/ DROPS OPHTHALMIC at 06:40

## 2023-03-06 ASSESSMENT — PAIN SCALES - GENERAL
PAINLEVEL_OUTOF10: 0

## 2023-03-06 NOTE — ANESTHESIA POSTPROCEDURE EVALUATION
Department of Anesthesiology  Postprocedure Note    Patient: Cheyanne Gallardo  MRN: [id number]  YOB: 1965  Date of evaluation: 3/6/2023      Procedure Summary     Date: 03/06/23 Room / Location: [address]    Anesthesia Start: 5160 Anesthesia Stop: 0815    Procedure: PHACOEMULSIFICATION WITH INTRAOCULAR LENS IMPLANT - RIGHT EYE (Right: Eye) Diagnosis:       Nuclear sclerotic cataract of right eye      (Nuclear sclerotic cataract of right eye)    Surgeons: Yesenia Martin MD Responsible Provider: Petros Webb MD    Anesthesia Type: MAC ASA Status: 3          Anesthesia Type: MAC    Ivy Phase I: Ivy Score: 10    Ivy Phase II: Ivy Score: 10      Anesthesia Post Evaluation    Patient location during evaluation: PACU  Patient participation: complete - patient participated  Level of consciousness: awake  Airway patency: patent  Nausea & Vomiting: no nausea and no vomiting  Complications: no  Cardiovascular status: hemodynamically stable and blood pressure returned to baseline  Respiratory status: spontaneous ventilation, nonlabored ventilation and room air  Hydration status: stable  Comments: Ms. Flip Rivas was seen resting comfortably following procedure. Appropriate for discharge home with .

## 2023-03-06 NOTE — ANESTHESIA PRE PROCEDURE
Department of Anesthesiology  Preprocedure Note       Name:  Navin Davis   Age:  61 y.o.  :  1965                                          MRN:  [id number]         Date:  3/6/2023      Surgeon: Oscar Brennan):  Sunny Scott MD    Procedure: Procedure(s):  PHACOEMULSIFICATION WITH INTRAOCULAR LENS IMPLANT - RIGHT EYE    Medications prior to admission:   Prior to Admission medications    Medication Sig Start Date End Date Taking?  Authorizing Provider   lactulose (CEPHULAC) 20 g packet Take 20 g by mouth 3 times daily    Historical Provider, MD   furosemide (LASIX) 20 MG tablet Take 20 mg by mouth daily    Historical Provider, MD   linaCLOtide (LINZESS) 72 MCG CAPS capsule Take 72 mcg by mouth every morning (before breakfast)    Historical Provider, MD   spironolactone (ALDACTONE) 50 MG tablet Take 50 mg by mouth daily    Historical Provider, MD   rifAXIMin (XIFAXAN) 550 MG tablet Take 550 mg by mouth 2 times daily    Historical Provider, MD   nadolol (CORGARD) 20 MG tablet Take 20 mg by mouth daily    Historical Provider, MD   rosuvastatin (CRESTOR) 10 MG tablet Take 10 mg by mouth daily    Historical Provider, MD   calcium carbonate (OSCAL) 500 MG TABS tablet Take 500 mg by mouth daily    Historical Provider, MD       Current medications:    Current Facility-Administered Medications   Medication Dose Route Frequency Provider Last Rate Last Admin    sodium chloride flush 0.9 % injection 5-40 mL  5-40 mL IntraVENous 2 times per day Elisabeth Verdugo MD        sodium chloride flush 0.9 % injection 5-40 mL  5-40 mL IntraVENous PRN Elisabeth Verdugo MD        0.9 % sodium chloride infusion   IntraVENous PRN Elisabeth Verdugo  mL/hr at 23 0647 New Bag at 23 0647    tetracaine (TETRAVISC) 0.5 % ophthalmic solution 1 drop  1 drop Right Eye See Admin Instructions Sunny Scott MD   1 drop at 23 0640    tropicamide (MYDRIACYL) 1 % ophthalmic solution 1 drop  1 drop Right Eye See Admin Instructions Sunny Scott MD   1 drop at 23 0650   • phenylephrine (MYDFRIN) 2.5 % ophthalmic solution 1 drop  1 drop Right Eye See Admin Instructions Sol Davis MD   1 drop at 23 0650   • cyclopentolate (CYCLOGYL) 1 % ophthalmic solution 1 drop  1 drop Right Eye PRN Sol Davis MD   1 drop at 23 0650   • ketorolac (ACULAR) 0.5 % ophthalmic solution 1 drop  1 drop Right Eye See Admin Instructions Sol Davis MD   1 drop at 23 0640       Allergies:    Allergies   Allergen Reactions   • Latex        Problem List:  There is no problem list on file for this patient.      Past Medical History:        Diagnosis Date   • Hyperlipidemia    • Hypertension    • Liver disease        Past Surgical History:        Procedure Laterality Date   • APPENDECTOMY     •  SECTION     • CHOLECYSTECTOMY     • INTRACAPSULAR CATARACT EXTRACTION Left 2023    PHACOEMULSIFICATION WITH INTRAOCULAR LENS IMPLANT - LEFT EYE performed by Sol Davis MD at [facility] SURG [facility]       Social History:    Social History     Tobacco Use   • Smoking status: Never   • Smokeless tobacco: Never   Substance Use Topics   • Alcohol use: Not Currently     Comment: occasionally                                Counseling given: Not Answered      Vital Signs (Current):   Vitals:    23 1213 23 0630   BP:  121/70   Pulse:  62   Resp:  17   Temp:  97.4 °F (36.3 °C)   TempSrc:  Temporal   SpO2:  100%   Weight: 183 lb (83 kg) 183 lb (83 kg)   Height: 5' 8\" (1.727 m) 5' 8\" (1.727 m)                                              BP Readings from Last 3 Encounters:   23 121/70   23 (!) 152/97   11/06/15 128/79       NPO Status: Time of last liquid consumption:                         Time of last solid consumption:                         Date of last liquid consumption: 23                        Date of last solid food consumption: 23    BMI:   Wt Readings from Last 3 Encounters:   23 183 lb (83 kg)   23 183 lb (83  kg)   11/06/15 190 lb 3.2 oz (86.3 kg)     Body mass index is 27.83 kg/m². CBC:   Lab Results   Component Value Date/Time    WBC 7.0 11/03/2015 12:40 PM    RBC 4.25 11/03/2015 12:40 PM    HGB 13.5 11/03/2015 12:40 PM    HCT 41.1 11/03/2015 12:40 PM    MCV 96.7 11/03/2015 12:40 PM    RDW 13.3 11/03/2015 12:40 PM     11/03/2015 12:40 PM       CMP:   Lab Results   Component Value Date/Time     11/03/2015 12:40 PM    K 3.9 11/03/2015 12:40 PM     11/03/2015 12:40 PM    CO2 24 11/03/2015 12:40 PM    BUN 10 11/03/2015 12:40 PM    CREATININE 0.5 11/03/2015 12:40 PM    GFRAA >60 11/03/2015 12:40 PM    GFRAA >60 11/14/2012 08:55 PM    AGRATIO 1.2 11/14/2012 08:55 PM    LABGLOM >60 11/03/2015 12:40 PM    GLUCOSE 105 11/03/2015 12:40 PM    PROT 8.3 11/14/2012 08:55 PM    CALCIUM 9.0 11/03/2015 12:40 PM    BILITOT 1.00 11/14/2012 08:55 PM    ALKPHOS 196 11/14/2012 08:55 PM     11/14/2012 08:55 PM    ALT 58 11/14/2012 08:55 PM       POC Tests: No results for input(s): POCGLU, POCNA, POCK, POCCL, POCBUN, POCHEMO, POCHCT in the last 72 hours. Coags: No results found for: PROTIME, INR, APTT    HCG (If Applicable):   Lab Results   Component Value Date    PREGTESTUR Neg 11/14/2012        ABGs: No results found for: PHART, PO2ART, YLG0NIR, EIU0YTZ, BEART, F5GQOPRX     Type & Screen (If Applicable):  No results found for: LABABO, LABRH    Drug/Infectious Status (If Applicable):  No results found for: HIV, HEPCAB    COVID-19 Screening (If Applicable): No results found for: COVID19        Anesthesia Evaluation   no history of anesthetic complications:   Airway: Mallampati: II  TM distance: >3 FB     Mouth opening: > = 3 FB   Dental:      Comment: Very poor dentition. Upper incisors blackened, chipped near gumline.     Pulmonary:       (-) COPD, asthma, rhonchi, wheezes, rales and not a current smoker                           Cardiovascular:  Exercise tolerance: good (>4 METS),   (+) hypertension:, hyperlipidemia    (-) dysrhythmias, orthopnea,  MIJARES, weak pulses and peripheral edema      Rhythm: regular  Rate: normal           Beta Blocker:  Dose within 24 Hrs (nadolol )         Neuro/Psych:   (+) psychiatric history (History of EtOH abuse):dementia (memory issues)   (-) seizures, TIA and CVA           GI/Hepatic/Renal:   (+) liver disease (Alcoholic cirrhosis, last LFTs elevated):,      (-) GERD (denies), no renal disease and no morbid obesity      ROS comment: +Linzess. Endo/Other:        (-) diabetes mellitus, blood dyscrasia                ROS comment: History of alcohol use disorder. Previously reported alcohol cessationsince 2018, patient reports sober x 7 months. Abdominal:         (-) obese Abdomen: soft. PE comment: No fluid wave, no concern for ascites. Vascular: Other Findings:             Anesthesia Plan      MAC     ASA 3     (Latex allergy. NPO appropriate. [facility] [place] denies active nausea / reflux. Tolerated procedure on left eye with total of 2mg midazolam. )        Anesthetic plan and risks discussed with patient. Plan discussed with CRNA. This pre-anesthesia assessment may be used as a history and physical.    DOS STAFF ADDENDUM:    Pt seen and examined, chart reviewed (including anesthesia, drug and allergy history). No interval changes to history and physical examination. Anesthetic plan, risks, benefits, alternatives, and personnel involved discussed with patient. Patient verbalized an understanding and agrees to proceed.       Dalia Ford MD  March 6, 2023  7:16 AM

## 2023-03-06 NOTE — DISCHARGE INSTRUCTIONS
Sol Argueta MD, CRYS    POST OPERATIVE INSTRUCTIONS FOR CATARACT SURGERY     Right   Eye       Starting the DAY of surgery use all drops as directed. Please locate the name of the individual medication you were prescribed below and follow the instructions for its use. ANTIBIOTIC: (CORREIA Cap): Your drop will be either Vigamox (moxifloxacin), Besivance or Polytrim. Instill 4 drops a day for 1 week. NSAID (GRAY Cap): Your drop will be BROMSITE, PROLENSA or KETOROLAC. Instill 1 drop daily for 4 weeks. Patients who receive KETOROLAC: Instill 1 drop 2 times a day for 2 weeks. STEROID (PINK Cap): Your steroid drop will be PREDNISOLONE ACETATE, INVELTYS OR LOTEMAX SM. Instill 4 drops a day for 1 week,   3 drops a day for 1 week,   2 drops a day for 1 week,  1 drop a day for 1 week. Please bring ALL of your eye drops with you to surgery and all follow-up appointments. Wait at least 3-5 minutes between eye drops. It's normal for some drops to briefly sting. POST OPERATIVE CATARACT DROP SCHEDULE    Week 1 Day 1 Day 2 Day 3 Day 4 Day 5 Day 6 Day 7   Misael Generous or POLYTRIM  (Tan) ? ?  ? ? ? ?  ? ? ? ?  ? ? ? ?  ? ? ? ?  ? ? ? ?  ? ?   ? ?  ? ?     Lambert Merck or KETOROLAC  Holton Heymann) ? ? ? ? ? ? ? Lynder Arn or LOTEMAX SM  (Pink) ? ?  ? ? ? ?  ? ? ? ?  ? ? ? ?  ? ? ? ?  ? ? ? ?  ? ? ? ?  ? ? Week 2 Day 8 Day 9 Day 10 Day 11 Day 12 Day 13 Day 15   BROMSITE, PROLENSA or  KETOROLAC  Holton Heymann) ? ? ? ? ? ? ? Lynder Arn or LOTEMAX SM   (Pink) ? ? ? ? ? ? ? ? ? ? ? ? ? ? ? ? ? ? ? ? ? Week 3 Day 15 Day 16 Day 17 Day 18 Day 19 Day 20 Day 21   Lambert Merck or  Anay Heymann) ? ? ? ? ? ? ? Lynder Arn or LOTEMAX SM   (Pink) ? ? ? ? ? ? ? ? ? ? ? ? ? ? Week 4 Day 22 Day 23 Day 24 Day 25 Day 26 Day 2 Day 29   Lambert Merck or  Holton Heymann) ? ? ? ? ? ? ? Pedro Hernandez or LOTEMAX CHEIKH   (Pink) ? ? ? ? ? ? ? Wear your protective shield at bedtime and nap time for 1 week to prevent accidentally rubbing or bumping your eye. The post-operative drops are VERY IMPORTANT. Use them as directed on the drop schedule given to you the day of surgery. Do not lift over 25 lbs for the first week. DO NOT RUB YOUR EYE. You may wash your hair 24 hours after surgery, but avoid getting water in your eye for the first 5 days. Use a dry wash cloth to protect water from getting in your eye while taking a shower. No eye makeup for 1 week. You may return to work anytime provided your job does not require heavy lifting or straining. If you work in a jerrod environment, please take one week off work after surgery. CALL THE OFFICE IMMEDIATELY IF YOU EXPERIENCE ANY OF THE FOLLOWING                   [phone]  Veil or curtain coming across vision. Sudden decrease in vision. Shower of NEW floaters. Increase in pain. Flashes of light.

## 2023-05-08 ENCOUNTER — OFFICE VISIT (OUTPATIENT)
Dept: ENT CLINIC | Age: 58
End: 2023-05-08
Payer: MEDICARE

## 2023-05-08 ENCOUNTER — PROCEDURE VISIT (OUTPATIENT)
Dept: AUDIOLOGY | Age: 58
End: 2023-05-08
Payer: MEDICARE

## 2023-05-08 VITALS
WEIGHT: 182 LBS | TEMPERATURE: 98 F | HEART RATE: 75 BPM | SYSTOLIC BLOOD PRESSURE: 118 MMHG | BODY MASS INDEX: 27.58 KG/M2 | OXYGEN SATURATION: 95 % | HEIGHT: 68 IN | DIASTOLIC BLOOD PRESSURE: 76 MMHG

## 2023-05-08 DIAGNOSIS — H93.13 TINNITUS, BILATERAL: ICD-10-CM

## 2023-05-08 DIAGNOSIS — H91.8X3 OTHER SPECIFIED HEARING LOSS, BILATERAL: Primary | ICD-10-CM

## 2023-05-08 DIAGNOSIS — H90.3 BILATERAL HIGH FREQUENCY SENSORINEURAL HEARING LOSS: Primary | ICD-10-CM

## 2023-05-08 PROCEDURE — 1036F TOBACCO NON-USER: CPT | Performed by: OTOLARYNGOLOGY

## 2023-05-08 PROCEDURE — 92567 TYMPANOMETRY: CPT | Performed by: AUDIOLOGIST

## 2023-05-08 PROCEDURE — 3017F COLORECTAL CA SCREEN DOC REV: CPT | Performed by: OTOLARYNGOLOGY

## 2023-05-08 PROCEDURE — G8419 CALC BMI OUT NRM PARAM NOF/U: HCPCS | Performed by: OTOLARYNGOLOGY

## 2023-05-08 PROCEDURE — G8427 DOCREV CUR MEDS BY ELIG CLIN: HCPCS | Performed by: OTOLARYNGOLOGY

## 2023-05-08 PROCEDURE — 92557 COMPREHENSIVE HEARING TEST: CPT | Performed by: AUDIOLOGIST

## 2023-05-08 PROCEDURE — 99213 OFFICE O/P EST LOW 20 MIN: CPT | Performed by: OTOLARYNGOLOGY

## 2023-05-08 NOTE — PROGRESS NOTES
Little Landry   1965, 61 y.o. female   [id number]       Referring Provider: Leydi Mxa MD  Referral Type: In an order in [address] [place] [address]    Reason for Visit: Evaluation of suspected change in hearing    Yareli Dong is a 61 y.o. female seen today, 5/8/2023 , for an initial audiologic evaluation. Patient was seen by Leydi Max MD following today's evaluation. AUDIOLOGIC AND OTHER PERTINENT MEDICAL HISTORY:      Little Lnadry noted improvement in hearing following cerumen removal performed by Leydi Max MD.  She also notes tinnitus, bilaterally. No additional significant otologic or medical history was reported. Little Landry denied otalgia, aural fullness, otorrhea, dizziness, imbalance, history of falls, history of head trauma, history of ear surgery, and family history of hearing loss. Date: 5/8/2023     IMPRESSIONS:      Today's results revealed a symmetric high-frequency hearing loss with excellent word recognition, bilaterally. Discussed use of tinnitus management strategies. Follow medical recommendations of Leydi Max MD.     ASSESSMENT AND FINDINGS:     Otoscopy revealed: Clear ear canals bilaterally    RIGHT EAR:  Hearing Sensitivity: Within normal limits through 4000Hz sloping to a mild hearing loss through 8kHz. Speech Recognition Threshold: 5 dB HL  Word Recognition: Excellent 100%, based on NU-6 25-word list at 50 dBHL using recorded speech stimuli. Tympanometry: Normal peak pressure and compliance, Type A tympanogram, consistent with normal middle ear function. LEFT EAR:  Hearing Sensitivity: Within normal limits through 4000Hz sloping to a mild hearing loss through 8kHz. Speech Recognition Threshold: 10 dB HL  Word Recognition: Excellent 100%, based on NU-6 25-word list at 50 dBHL using recorded speech stimuli.     Tympanometry: Normal peak pressure and compliance, Type A tympanogram, consistent with normal middle ear

## 2023-05-08 NOTE — PATIENT INSTRUCTIONS
into a persons ear      Some additional items that may be helpful:   - Remain patient - this is important for both parties   - Write down items that still cannot be heard/understood. You may write with pen/paper or consider typing/texting on a cell phone or smart device. - If background noise is unavoidable, try to keep yourself in a good position in the room. By sitting at a mckeon on the side of the restaurant (preferably a corner), it will be easier to communicate than if you were sitting at a table in the middle with background noise surrounding you. Keep yourself positioned away from music speakers or heavy foot traffic. Tinnitus: Overview and Management Strategies          Many people have some ringing sounds in their ears once in a while. You may hear a roar, a hiss, a tinkle, or a buzz. The sound usually lasts only a few minutes. If it goes on all the time, you may have tinnitus. Tinnitus is usually caused by long-term exposure to loud noise. This damages the nerves in the inner ear. It can occur with all types of hearing loss. It may be a symptom of almost any ear problem. Tinnitus may be caused by a buildup of earwax. Or, it may be caused by ear infections or certain medicines (especially antibiotics or large amounts of aspirin). You can also hear noises in your ears because of an injury to the ears, drinking too much alcohol or caffeine, or a medical condition. Other conditions may also contribute to tinnitus, including: head and neck trauma, temporomandibular joint disorder (TMJ), sinus pressure and barometric trauma, traumatic brain injury, metabolic disorders, autoimmune disorders, stress, and high blood pressure. You may need tests to evaluate your hearing and to find causes of long-lasting tinnitus. Your doctor may suggest one or more treatments to help you cope with the tinnitus. You can also do things at home to help reduce symptoms.     Follow-up care is a key part of your

## 2023-05-08 NOTE — PROGRESS NOTES
[facility]       CHIEF COMPLAINT  Chief Complaint   Patient presents with    Hearing Loss       HISTORY       Mustapha Holloway is a 61 y.o. female here for follow up of hearing loss. EXAMINATION   WDWN, NAD  Ears: TM and EACs appeared to be normal.        AUDIOGRAM                    COUNSELING    Audiogram results were reviewed and discussed with Pebbles. I advised of type and severity of hearing loss and the probable etiology of early hearing loss of aging (presbycusis). I advised of the need for avoidance of prolonged or frequent exposure to excessive noise and the need for noise protection, if such exposure is unavoidable. I discussed the possible etiology of tinnitus and treatment/coping measures including masking techniques. I advised of the need for annual and prn hearing tests. Patient was advised of the greater decrease in word and speech understanding in the presence of background noise and of the expected difficulty understanding speech in the presence of moderate to high volume background noise associated with this hearing loss. Efraín Webb / Dash Pinto / Karis Chand / Erendira Peters was seen today for hearing loss. Diagnoses and all orders for this visit:    Bilateral high frequency sensorineural hearing loss         RECOMMENDATIONS / PLAN    Annual hearing test.  Lipoflavonoid plus or other over-the-counter meds as needed for tinnitus. See patient instructions, on file for this visit, which were fully discussed with the patient. Return for any further ENT or sinus problems or symptoms.

## 2023-05-08 NOTE — PATIENT INSTRUCTIONS
You will probably have a decrease in understanding of speech in the presence of background noise and expected difficulty understanding speech in the presence of moderate to high level of background noise. This is typical of your type of hearing loss. You should return if your ears plug up with ear wax causing difficulty hearing or pressure. You may use an over the counter ear wax removal kit (such as Murine, Bausch and Lomb, NeilMed, or Debrox wax removal system) for ear wax removal, as needed. It may help to use Debrox (OTC) for 4 days prior to future visits for ear cleaning. This may soften your ear wax and facilitate removal of the wax. You should return for an annual hearing test to monitor your hearing, and whenever your hearing further decreases significantly. You should employ the tinnitus masking techniques and strategies we discussed, as needed. Bedside tinnitus masking devices (e.g. a white noise machine, noise machine, noise gayathri on your cell phone, fan on in the room, radio with light music or tuned between stations to white noise static) can be very helpful. You should avoid exposure to excessively high levels of noise/sound and use hearing protection measures we discussed, as needed, if such exposure is unavoidable. NO Q-TIPS IN THE EARS  You should never clean your ears with a Q-tip, cotton tipped applicator, Comfort pin, paper clip, pen cap, nail file, or any other instrument. I recommend only use of one the several ear wax removal kits available \"over the counter\" (eg. Bausch and Lomb or Murine, or The Asael-Maury) if you feel a need to try to remove ear wax. No other methods should be self used for this purpose as there is danger of injury to the ear and risk of irreparable and irreversible permanent hearing loss and permanent dizziness.

## 2023-07-07 ENCOUNTER — TELEPHONE (OUTPATIENT)
Dept: ENT CLINIC | Age: 58
End: 2023-07-07

## 2023-07-18 NOTE — TELEPHONE ENCOUNTER
Called patient to offer her the appointment and she said she does not need to be seen any longer as she used a hair dryer to get the water out of her ear.

## (undated) DEVICE — SOLUTION IRRIG 500ML STRL H2O NONPYROGENIC

## (undated) DEVICE — SOLUTION IRRIG BSS ST 500ML

## (undated) DEVICE — Device: Brand: MEDEX

## (undated) DEVICE — SYRINGE MED 30ML STD CLR PLAS LUERLOCK TIP N CTRL DISP

## (undated) DEVICE — SYRINGE TB 1ML NDL 25GA L0.625IN PLAS SLIP TIP CONVENTIONAL

## (undated) DEVICE — GLOVE SURG SZ 75 STD WHT LTX SYN POLYMER BEAD REINF ANTI RL

## (undated) DEVICE — SINGLE USE MEDICAL DEVICE FOR OPHTHALMIC SURGERY: Brand: SINSKEY IOL HOOK 12/BOX

## (undated) DEVICE — Device

## (undated) DEVICE — SYRINGE MED 3ML CLR PLAS STD N CTRL LUERLOCK TIP DISP

## (undated) DEVICE — WIPE INSTR W73XL73CM VISITEC

## (undated) DEVICE — SURGICAL PROC PACK SHT WEST V4